# Patient Record
Sex: FEMALE | Race: BLACK OR AFRICAN AMERICAN | NOT HISPANIC OR LATINO | Employment: FULL TIME | URBAN - METROPOLITAN AREA
[De-identification: names, ages, dates, MRNs, and addresses within clinical notes are randomized per-mention and may not be internally consistent; named-entity substitution may affect disease eponyms.]

---

## 2017-01-02 ENCOUNTER — HOSPITAL ENCOUNTER (EMERGENCY)
Facility: HOSPITAL | Age: 27
Discharge: HOME | End: 2017-01-02
Attending: EMERGENCY MEDICINE
Payer: MEDICAID

## 2017-01-02 VITALS
TEMPERATURE: 98 F | RESPIRATION RATE: 18 BRPM | SYSTOLIC BLOOD PRESSURE: 167 MMHG | WEIGHT: 220 LBS | HEART RATE: 79 BPM | BODY MASS INDEX: 35.36 KG/M2 | HEIGHT: 66 IN | OXYGEN SATURATION: 100 % | DIASTOLIC BLOOD PRESSURE: 95 MMHG

## 2017-01-02 DIAGNOSIS — H10.9 CONJUNCTIVITIS, UNSPECIFIED CONJUNCTIVITIS TYPE, UNSPECIFIED LATERALITY: Primary | ICD-10-CM

## 2017-01-02 LAB
B-HCG UR QL: NEGATIVE
CTP QC/QA: YES

## 2017-01-02 PROCEDURE — 81025 URINE PREGNANCY TEST: CPT

## 2017-01-02 PROCEDURE — 99283 EMERGENCY DEPT VISIT LOW MDM: CPT | Mod: 25

## 2017-01-02 RX ORDER — BACITRACIN ZINC AND POLYMYXIN B SULFATE 500; 10000 [USP'U]/G; [USP'U]/G
OINTMENT OPHTHALMIC 2 TIMES DAILY
Qty: 15 G | Refills: 0 | Status: SHIPPED | OUTPATIENT
Start: 2017-01-02 | End: 2017-01-12

## 2017-01-02 NOTE — DISCHARGE INSTRUCTIONS
Conjunctivitis, Nonspecific    The membrane that covers the white part of your eye (the conjunctiva) is inflamed. Inflammation happens when your body responds to an injury, allergic reaction, infection, or illness. Symptoms of inflammation in the eye may include redness, irritation, itching, swelling, or burning. These symptoms should go away within the next 24 hours. Conjunctivitis may be related to a particle that was in your eye. If so, it may wash out with your tears or irrigation treatment. Being exposed to liquid chemicals or fumes may also cause this reaction.   Home care  · Apply a cold pack (ice in a plastic bag, wrapped in a towel) over the eye for 20 minutes at a time. This will reduce pain.  · Artificial tears may be prescribed to reduce irritation or redness.  These should be used 3 to 4 times a day.  · You may use acetaminophen or ibuprofen to control pain, unless another medicine was prescribed.(Note: If you have chronic liver or kidney disease, or if you have ever had a stomach ulcer or gastrointestinal bleeding, talk with your healthcare provider before using these medicines.)  Follow-up care  Follow up with your healthcare provider, or as advised.  When to seek medical advice  Call your healthcare provider right away if any of these occur:  · Increased eyelid swelling  · Increased eye pain  · Increased redness or drainage from the eye  · Increased blurry vision or increased sensitivity to light  · Failure of normal vision to return within 24 to 48 hours  © 5008-2723 [employer]. [address] [place] [address]. All rights reserved. This information is not intended as a substitute for professional medical care. Always follow your healthcare professional's instructions.

## 2017-01-02 NOTE — ED PROVIDER NOTES
Encounter Date: 1/2/2017       History     Chief Complaint   Patient presents with    Conjunctivitis     pt c/o conjunctivitis to (L) eye starting at a 7:00 am     Review of patient's allergies indicates:  No Known Allergies  HPI    26 y.o. healthy female co OS redness, drainage, no other sx, no visual changes  No trauma to eye  +sick contacts  No self tx      Past Medical History   Diagnosis Date    Eczema      No past medical history pertinent negatives.  Past Surgical History   Procedure Laterality Date    Cholecystectomy       Family History   Problem Relation Age of Onset    Breast cancer Neg Hx     Colon cancer Neg Hx     Ovarian cancer Neg Hx      Social History   Substance Use Topics    Smoking status: Current Every Day Smoker     Packs/day: 0.50     Types: Cigarettes    Smokeless tobacco: Current User    Alcohol use Yes      Comment: ocassionally     Review of Systems  All systems were reviewed and were negative except as noted in the HPI.    Physical Exam   Initial Vitals   BP Pulse Resp Temp SpO2   01/02/17 1201 01/02/17 1201 01/02/17 1201 01/02/17 1201 01/02/17 1201   167/95 79 18 98.4 °F (36.9 °C) 100 %     Physical Exam    Gen: awake, alert, NAD  OS injection, AC quiet, PERRLA EOMI, lids normal  Head NCAT, sclera clear OD  Neck supple, no meningismus  Chest clear to auscultation, no respiratory distress  Extremities W/WP   Skin w/d  Psych conversant  Neuro: A/A, TOLLIVER      ED Course   Procedures  Labs Reviewed   POCT URINE PREGNANCY              top abx                 ED Course     Clinical Impression:   The encounter diagnosis was Conjunctivitis, unspecified conjunctivitis type, unspecified laterality.      Discharged to home in stable condition, return to ED warnings given, follow up and patient care instructions given.    Tucker Hall MD, IDALIA, CPE, FACEP  Department of Emergency Medicine  , [employer]       Alfonso Hall,  MD  01/02/17 6838

## 2017-01-02 NOTE — ED AVS SNAPSHOT
[facility]  [place] [address]               Georgina Mehta   2017 11:51 AM   ED    Description:  Female : 1990   Department:  [facility]           Your Care was Coordinated By:     Provider Role From To    Alfonso Hall MD Attending Provider 17 1206 --      Reason for Visit     Conjunctivitis           Diagnoses this Visit        Comments    Conjunctivitis, unspecified conjunctivitis type, unspecified laterality    -  Primary       ED Disposition     ED Disposition Condition Comment    Discharge             To Do List           Follow-up Information     Schedule an appointment as soon as possible for a visit with [facility].    Specialty:  Internal Medicine    Contact information:    [address] [address] [phone]    Additional information:    3rd Floor, [address]       These Medications        Disp Refills Start End    bacitracin-polymyxin b (POLYSPORIN) ophthalmic ointment 15 g 0 2017    Place into the left eye 2 (two) times daily. - Left Eye    [facility] [address] [place] [address] Ph #: [phone]         [facility]     [facility] Care [facility] -  Assistance  Registered nurses in the [facility] provide clinical advisement, health education, appointment booking, and other advisory services.  Call for this free service at [phone].             Medications           Message regarding Medications     Verify the changes and/or additions to your medication regime listed below are the same as discussed with your clinician today.  If any of these changes or additions are incorrect, please notify your healthcare provider.        START taking these NEW medications        Refills    bacitracin-polymyxin b (POLYSPORIN) ophthalmic ointment 0    Sig: Place into the left eye 2 (two) times  "daily.    Class: Print    Route: Left Eye           Verify that the below list of medications is an accurate representation of the medications you are currently taking.  If none reported, the list may be blank. If incorrect, please contact your healthcare provider. Carry this list with you in case of emergency.           Current Medications     bacitracin-polymyxin b (POLYSPORIN) ophthalmic ointment Place into the left eye 2 (two) times daily.    DEXTROMETHORPHAN HBR (VICKS DAYQUIL COUGH ORAL) Take by mouth.    naproxen (NAPROSYN) 375 MG tablet Take 1 tablet (375 mg total) by mouth 2 (two) times daily with meals.    PNV with Ca,no.71-iron-FA (PRENATE PLUS) 27-1 mg Tab Take 1 tablet by mouth once daily.           Clinical Reference Information           Your Vitals Were     BP Pulse Temp Resp Height Weight    167/95 79 98.4 °F (36.9 °C) 18 5' 6" (1.676 m) 99.8 kg (220 lb)    SpO2 BMI             100% 35.51 kg/m2         Allergies as of 1/2/2017     No Known Allergies      Immunizations Administered on Date of Encounter - 1/2/2017     None      ED Micro, Lab, POCT     Start Ordered       Status Ordering Provider    01/02/17 1204 01/02/17 1203  POCT urine pregnancy  Once      Ordered       ED Imaging Orders     None        Discharge Instructions         Conjunctivitis, Nonspecific    The membrane that covers the white part of your eye (the conjunctiva) is inflamed. Inflammation happens when your body responds to an injury, allergic reaction, infection, or illness. Symptoms of inflammation in the eye may include redness, irritation, itching, swelling, or burning. These symptoms should go away within the next 24 hours. Conjunctivitis may be related to a particle that was in your eye. If so, it may wash out with your tears or irrigation treatment. Being exposed to liquid chemicals or fumes may also cause this reaction.   Home care  · Apply a cold pack (ice in a plastic bag, wrapped in a towel) over the eye for 20 minutes " at a time. This will reduce pain.  · Artificial tears may be prescribed to reduce irritation or redness.  These should be used 3 to 4 times a day.  · You may use acetaminophen or ibuprofen to control pain, unless another medicine was prescribed.(Note: If you have chronic liver or kidney disease, or if you have ever had a stomach ulcer or gastrointestinal bleeding, talk with your healthcare provider before using these medicines.)  Follow-up care  Follow up with your healthcare provider, or as advised.  When to seek medical advice  Call your healthcare provider right away if any of these occur:  · Increased eyelid swelling  · Increased eye pain  · Increased redness or drainage from the eye  · Increased blurry vision or increased sensitivity to light  · Failure of normal vision to return within 24 to 48 hours  © 7983-5075 [employer]. [address] [place] [address], [place] [address]. All rights reserved. This information is not intended as a substitute for professional medical care. Always follow your healthcare professional's instructions.          [facility] Sign-Up     Activating your [facility] account is as easy as 1-2-3!     1) Visit [email or web address], select Sign Up Now, enter this activation code and your date of birth, then select Next.  [email or web address]  Expires: 2/16/2017 12:11 PM      2) Create a username and password to use when you visit [facility] in the future and select a security question in case you lose your password and select Next.    3) Enter your e-mail address and click Sign Up!    Additional Information  If you have questions, please e-mail [email or web address] or call [phone] to talk to our [facility] staff. Remember, [facility] is NOT to be used for urgent needs. For medical emergencies, dial 911.         Smoking Cessation     If you would like to quit smoking:   You may be eligible for free services if you are a [place] resident and started smoking cigarettes before September 1,  1988.  Call the Smoking Cessation Trust (SCT) toll free at (607) 522-0456 or (096) 088-4718.   Call 1-800-QUIT-NOW if you do not meet the above criteria.             [facility] complies with applicable Federal civil rights laws and does not discriminate on the basis of race, color, national origin, age, disability, or sex.        Language Assistance Services     ATTENTION: Language assistance services are available, free of charge. Please call [phone].      ATENCIÓN: Si habla español, tiene a chavez disposición servicios gratuitos de asistencia lingüística. Llame al [phone].     CHÚ Ý: N?u b?n nói Ti?ng Vi?t, có các d?ch v? h? tr? ngôn ng? mi?n phí dành cho b?n. G?i s? [phone].

## 2017-01-31 ENCOUNTER — HOSPITAL ENCOUNTER (EMERGENCY)
Facility: HOSPITAL | Age: 27
Discharge: LEFT BEFORE DISCHARGE | End: 2017-01-31
Attending: EMERGENCY MEDICINE
Payer: MEDICAID

## 2017-01-31 VITALS
DIASTOLIC BLOOD PRESSURE: 63 MMHG | TEMPERATURE: 98 F | BODY MASS INDEX: 36.16 KG/M2 | RESPIRATION RATE: 18 BRPM | WEIGHT: 225 LBS | SYSTOLIC BLOOD PRESSURE: 132 MMHG | OXYGEN SATURATION: 100 % | HEIGHT: 66 IN | HEART RATE: 89 BPM

## 2017-01-31 DIAGNOSIS — R05.9 COUGH: ICD-10-CM

## 2017-01-31 LAB
B-HCG UR QL: NEGATIVE
CTP QC/QA: YES
FLUAV AG SPEC QL IA: NEGATIVE
FLUBV AG SPEC QL IA: NEGATIVE
SPECIMEN SOURCE: NORMAL

## 2017-01-31 PROCEDURE — 87400 INFLUENZA A/B EACH AG IA: CPT

## 2017-01-31 PROCEDURE — 99283 EMERGENCY DEPT VISIT LOW MDM: CPT | Mod: 25

## 2017-01-31 PROCEDURE — 81025 URINE PREGNANCY TEST: CPT | Performed by: EMERGENCY MEDICINE

## 2017-01-31 PROCEDURE — 25000003 PHARM REV CODE 250: Performed by: PHYSICIAN ASSISTANT

## 2017-01-31 RX ORDER — CETIRIZINE HYDROCHLORIDE 10 MG/1
10 TABLET ORAL
Status: COMPLETED | OUTPATIENT
Start: 2017-01-31 | End: 2017-01-31

## 2017-01-31 RX ORDER — IBUPROFEN 600 MG/1
600 TABLET ORAL
Status: COMPLETED | OUTPATIENT
Start: 2017-01-31 | End: 2017-01-31

## 2017-01-31 RX ADMIN — IBUPROFEN 600 MG: 600 TABLET, FILM COATED ORAL at 01:01

## 2017-01-31 RX ADMIN — CETIRIZINE HYDROCHLORIDE 10 MG: 10 TABLET, FILM COATED ORAL at 01:01

## 2017-01-31 NOTE — ED NOTES
"Patient has told multiple nurses that "It don't make sense for me to wait for an x-ray, my body is hurting so bad, I'm bout to leave."  Patient also was overheard making similar remarks on phone while in [facility].  Patient was then visualized leaving department, not responding to name called.  Provider notified, patient has eloped.   "

## 2017-01-31 NOTE — ED TRIAGE NOTES
C/o body aches, cough, sore throat, weakness, nasal congestion, runny nose. Denies N/V/D/F x 1 days. No OTC meds taken today.

## 2017-01-31 NOTE — ED PROVIDER NOTES
"Encounter Date: 1/31/2017    SCRIBE #1 NOTE: I, Bre Vaz, am scribing for, and in the presence of,  Stanley Hager PA-C. I have scribed the following portions of the note - Other sections scribed: ROS and HPI.       History     Chief Complaint   Patient presents with    Generalized Body Aches     Pt. c/o body aches, cough and sore throat that began yesterday.      Review of patient's allergies indicates:  No Known Allergies  HPI Comments: CC: Generalized Body Aches    HPI: This 26 y.o. female with a past medical history of Eczema, presents to the ED complaining of  sore throat, painful swallowing, productive cough "brown/yellow" mucus, chills, generalized body aches, CP after cough, intermittent SOB since yesterday. Symptoms are acute, moderate, and constant. No flu shot this yr. No prior medical intervention today. No known recent sick exposure.  The history is provided by the patient. No  was used.     Past Medical History   Diagnosis Date    Eczema      No past medical history pertinent negatives.  Past Surgical History   Procedure Laterality Date    Cholecystectomy       Family History   Problem Relation Age of Onset    Breast cancer Neg Hx     Colon cancer Neg Hx     Ovarian cancer Neg Hx      Social History   Substance Use Topics    Smoking status: Current Every Day Smoker     Packs/day: 0.50     Types: Cigarettes    Smokeless tobacco: Current User    Alcohol use Yes      Comment: ocassionally     Review of Systems   Constitutional: Negative for chills and fever.   HENT: Positive for rhinorrhea and sore throat. Negative for ear pain.    Respiratory: Positive for cough (productive) and shortness of breath.    Cardiovascular: Positive for chest pain (with cough).   Gastrointestinal: Negative for abdominal pain, diarrhea, nausea and vomiting.   Genitourinary: Negative for frequency and hematuria.   Musculoskeletal: Positive for myalgias (generalized).       Physical Exam   Initial " Vitals   BP Pulse Resp Temp SpO2   01/31/17 1025 01/31/17 1025 01/31/17 1025 01/31/17 1025 01/31/17 1025   142/85 88 17 98.9 °F (37.2 °C) 99 %     Physical Exam    Vitals reviewed.  Constitutional: She appears well-developed and well-nourished. She is not diaphoretic. No distress.   HENT:   Head: Normocephalic and atraumatic.   Right Ear: External ear normal.   Left Ear: External ear normal.   Nose: Nose normal.   Mouth/Throat: Oropharynx is clear and moist.   Sinus congestion with no maxillary or frontal sinus tenderness.   Eyes: Conjunctivae are normal. No scleral icterus.   Neck: Normal range of motion. Neck supple.   Cardiovascular: Normal rate, regular rhythm, normal heart sounds and intact distal pulses.   Pulmonary/Chest: Breath sounds normal. No respiratory distress. She has no wheezes. She has no rhonchi. She has no rales. She exhibits no tenderness.   Musculoskeletal: Normal range of motion.   Lymphadenopathy:     She has no cervical adenopathy.   Neurological: She is alert and oriented to person, place, and time.   Skin: Skin is warm and dry.         ED Course   Procedures  Labs Reviewed   INFLUENZA A AND B ANTIGEN   POCT URINE PREGNANCY             Medical Decision Making:   History:   Old Medical Records: I decided to obtain old medical records.    Emergency evaluation of a 26-year-old female complaining of myalgias, sore throat, productive cough since yesterday.  She presents in no distress, afebrile and with normal vital signs.  HEENT exam is unremarkable.  I doubt peritonsillar abscess, posterior pharyngeal abscess, epiglottitis.  Lungs sounds are clear with normal work of breathing.  Heart sounds are normal.  Chest x-ray obtained shows no consolidation, effusion, pneumothorax.  I doubt pneumonia.  Influenza test is negative.  I suspect patient has a viral URI with cough and will plan to treat with NSAIDs and antihistamine.  Patient eloped prior to discussion of chest x-ray results and discharge.           Scribe Attestation:   Scribe #1: I performed the above scribed service and the documentation accurately describes the services I performed. I attest to the accuracy of the note.    Attending Attestation:           Physician Attestation for Scribe:  Physician Attestation Statement for Scribe #1: I, Stanley Hager PA-C, reviewed documentation, as scribed by Bre Vaz in my presence, and it is both accurate and complete.                 ED Course     Clinical Impression:   The encounter diagnosis was Cough.          Stanley Hager PA-C  01/31/17 4360

## 2017-03-14 ENCOUNTER — HOSPITAL ENCOUNTER (EMERGENCY)
Facility: HOSPITAL | Age: 27
Discharge: HOME | End: 2017-03-14
Attending: EMERGENCY MEDICINE
Payer: MEDICAID

## 2017-03-14 VITALS
WEIGHT: 230 LBS | RESPIRATION RATE: 18 BRPM | OXYGEN SATURATION: 98 % | SYSTOLIC BLOOD PRESSURE: 135 MMHG | HEIGHT: 66 IN | DIASTOLIC BLOOD PRESSURE: 71 MMHG | HEART RATE: 92 BPM | TEMPERATURE: 99 F | BODY MASS INDEX: 36.96 KG/M2

## 2017-03-14 DIAGNOSIS — R06.02 SHORTNESS OF BREATH: Primary | ICD-10-CM

## 2017-03-14 LAB
B-HCG UR QL: NEGATIVE
CTP QC/QA: YES

## 2017-03-14 PROCEDURE — 81025 URINE PREGNANCY TEST: CPT | Performed by: EMERGENCY MEDICINE

## 2017-03-14 PROCEDURE — 93005 ELECTROCARDIOGRAM TRACING: CPT

## 2017-03-14 PROCEDURE — 99284 EMERGENCY DEPT VISIT MOD MDM: CPT | Mod: 25

## 2017-03-15 NOTE — ED PROVIDER NOTES
Encounter Date: 3/14/2017    SCRIBE #1 NOTE: I, Maryam Lopez, am scribing for, and in the presence of,  Aren Kee MD. I have scribed the following portions of the note - Other sections scribed: HPI/ROS.       History     Review of patient's allergies indicates:  No Known Allergies  HPI Comments: CC: Shortness of Breath     HPI: This 26 y.o. F who has history of eczema and cholecystectomy presents to the ED for evaluation of mild chest discomfort with associated palpitations, SOB, and R arm numbness that occurred 1 hour prior to arrival while the patient was in the shower. She reports intermittent leg swelling that she attributes to being on her feet at work, but currently denies leg swelling. No recent travel or BC use. The pt denies fever, chills, N/V/D, and any other associated symptoms.     The history is provided by the patient. No  was used.     Past Medical History:   Diagnosis Date    Eczema      Past Surgical History:   Procedure Laterality Date    CHOLECYSTECTOMY       Family History   Problem Relation Age of Onset    Breast cancer Neg Hx     Colon cancer Neg Hx     Ovarian cancer Neg Hx      Social History   Substance Use Topics    Smoking status: Current Every Day Smoker     Packs/day: 0.50     Types: Cigarettes    Smokeless tobacco: Current User    Alcohol use Yes      Comment: ocassionally     Review of Systems   Constitutional: Negative for chills, diaphoresis and fever.   HENT: Negative for congestion and sore throat.    Eyes: Negative for visual disturbance.   Respiratory: Positive for shortness of breath. Negative for cough.    Cardiovascular: Positive for chest pain (mild discomfort) and palpitations. Negative for leg swelling.   Gastrointestinal: Negative for abdominal pain, diarrhea, nausea and vomiting.   Genitourinary: Negative for dysuria and frequency.   Musculoskeletal: Negative for myalgias.   Skin: Negative for rash.   Neurological: Positive for numbness  "(R arm). Negative for headaches.       Physical Exam   Initial Vitals   BP Pulse Resp Temp SpO2   03/14/17 1835 03/14/17 1835 03/14/17 1835 03/14/17 1835 03/14/17 1835   158/95 96 20 98.3 °F (36.8 °C) 100 %     Physical Exam    Constitutional: She appears well-developed and well-nourished. She is not diaphoretic. No distress.   HENT:   Head: Normocephalic and atraumatic.   Right Ear: External ear normal.   Left Ear: External ear normal.   Eyes: Conjunctivae and EOM are normal. Pupils are equal, round, and reactive to light.   Neck: Normal range of motion.   Cardiovascular: Normal rate and regular rhythm. Exam reveals no friction rub.    No murmur heard.  Pulmonary/Chest: Breath sounds normal. No respiratory distress. She has no wheezes. She has no rhonchi. She has no rales.   Abdominal: She exhibits no distension.   Musculoskeletal: She exhibits no edema.   Neurological: She is alert. GCS eye subscore is 4. GCS verbal subscore is 5. GCS motor subscore is 6.   Skin: Skin is warm and dry.   Psychiatric: She has a normal mood and affect. Thought content normal.         ED Course   Procedures  Labs Reviewed   POCT URINE PREGNANCY             Medical Decision Making:   Initial Assessment:   26-year-old female presents after an episode of shortness of breath, chest discomfort, palpitations, and feeling like she "was going to die".  No history of similar episodes. Only risk factor for pulmonary embolism is smoking.  Physical examination reveals normal vital signs, well appearance, normal cardiopulmonary exam, no signs of DVT, no hypoxia or tachycardia.  Differential Diagnosis:   Symptoms sound most likely to represent panic/anxiety.  Will obtain EKG and chest x-ray to screen for cardiopulmonary abnormality.  Very low likelihood of pulmonary embolus, arrhythmia.  Independently Interpreted Test(s):   I have ordered and independently interpreted X-rays - see summary below.       <> Summary of X-Ray Reading(s): Chest x-ray: No " acute abnormality  I have ordered and independently interpreted EKG Reading(s) - see summary below       <> Summary of EKG Reading(s):  NSR, nl axis, nl intervals, no definite acute ischemic changes  ED Management:  Workup unremarkable.  Have counseled the patient regarding possibility of panic/anxiety, recommending follow-up with primary physician.  I have also counseled her regarding the remote possibility of arrhythmia and suggested follow-up with a cardiologist if she has continued episodes.      Patient counseled regarding test results, recommendations for supportive care, and need for follow-up.  Return precautions given.              Scribe Attestation:   Scribe #1: I performed the above scribed service and the documentation accurately describes the services I performed. I attest to the accuracy of the note.    Attending Attestation:           Physician Attestation for Scribe:  Physician Attestation Statement for Scribe #1: I, Aren Kee MD, reviewed documentation, as scribed by Maryam Lopez in my presence, and it is both accurate and complete.                 ED Course     Clinical Impression:   There were no encounter diagnoses.          Aren Kee III, MD  03/14/17 2029

## 2017-03-15 NOTE — ED TRIAGE NOTES
Pt states that today at 4:00pm started having chest pains and shortness of breath.  Pt states that heart was beating really fast.  Pt was lightheadedness at the time of chest pains.  Pt states pain is at the level of left chest.  Pain rates 7

## 2017-03-15 NOTE — DISCHARGE INSTRUCTIONS
Your EKG and chest x-ray were normal.  Return to the emergency department if you develop severe chest pain, fainting, severe difficulty breathing, or for any worsening medical concerns.

## 2022-10-10 NOTE — ED AVS SNAPSHOT
[facility]  [place] [address]               Georgina Mehta   3/14/2017  7:24 PM   ED    Description:  Female : 1990   Department:  [facility]           Your Care was Coordinated By:     Provider Role From To    Aren Kee III, MD Attending Provider 17 1943 --      Reason for Visit     Chest Pain           Diagnoses this Visit        Comments    Shortness of breath    -  Primary       ED Disposition     None           To Do List           Follow-up Information     Follow up with Shahab Martinez Jr, MD.    Specialty:  Family Medicine    Contact information:    [address] [employer]  [facility] [id number] [phone]          Follow up with Benjamin Paula MD.    Specialty:  Cardiology    Why:  if you have more episode    Contact information:    [address] [place] [address]  [phone]        [facility]     [facility] -  Assistance  Registered nurses in the [facility] provide clinical advisement, health education, appointment booking, and other advisory services.  Call for this free service at [phone].             Medications           Message regarding Medications     Verify the changes and/or additions to your medication regime listed below are the same as discussed with your clinician today.  If any of these changes or additions are incorrect, please notify your healthcare provider.             Verify that the below list of medications is an accurate representation of the medications you are currently taking.  If none reported, the list may be blank. If incorrect, please contact your healthcare provider. Carry this list with you in case of emergency.                Clinical Reference Information           Your Vitals Were     BP Pulse Temp Resp Height Weight    135/71 (BP Location: Right arm, Patient Position: Sitting, BP Method: Automatic) 92 98.6 °F  Patient's blood pressure is at goal   Continue valsartan 160, hydrochlorothiazide 25 mg  "(37 °C) (Oral) 18 5' 6" (1.676 m) 104.3 kg (230 lb)    Last Period SpO2 BMI          03/04/2017 98% 37.12 kg/m2        Allergies as of 3/14/2017     No Known Allergies      Immunizations Administered on Date of Encounter - 3/14/2017     None      ED Micro, Lab, POCT     Start Ordered       Status Ordering Provider    03/14/17 1852 03/14/17 1851  POCT urine pregnancy  Once      Final result       ED Imaging Orders     Start Ordered       Status Ordering Provider    03/14/17 2000 03/14/17 1959  X-Ray Chest PA And Lateral  1 time imaging      Final result         Discharge Instructions       Your EKG and chest x-ray were normal.  Return to the emergency department if you develop severe chest pain, fainting, severe difficulty breathing, or for any worsening medical concerns.    [facility] Sign-Up     Activating your [facility] account is as easy as 1-2-3!     1) Visit [email or web address], select Sign Up Now, enter this activation code and your date of birth, then select Next.  [email or web address]  Expires: 4/28/2017  8:26 PM      2) Create a username and password to use when you visit [facility] in the future and select a security question in case you lose your password and select Next.    3) Enter your e-mail address and click Sign Up!    Additional Information  If you have questions, please e-mail [email or web address] or call [phone] to talk to our [facility] staff. Remember, [facility] is NOT to be used for urgent needs. For medical emergencies, dial 911.          [facility] complies with applicable Federal civil rights laws and does not discriminate on the basis of race, color, national origin, age, disability, or sex.        Language Assistance Services     ATTENTION: Language assistance services are available, free of charge. Please call [phone].      ATENCIÓN: Si habla español, tiene a chavez disposición servicios gratuitos de asistencia lingüística. Llame al [phone].     CHÚ Ý: N?u b?n nói " Ti?ng Vi?t, có các d?ch v? h? tr? ngôn ng? mi?n phí dành cho b?n. G?i s? [phone].

## 2023-06-20 ENCOUNTER — PATIENT MESSAGE (OUTPATIENT)
Dept: RESEARCH | Facility: HOSPITAL | Age: 33
End: 2023-06-20

## 2023-06-27 ENCOUNTER — PATIENT MESSAGE (OUTPATIENT)
Dept: RESEARCH | Facility: HOSPITAL | Age: 33
End: 2023-06-27

## 2024-07-03 ENCOUNTER — ON-DEMAND VIRTUAL (OUTPATIENT)
Dept: URGENT CARE | Facility: CLINIC | Age: 34
End: 2024-07-03

## 2024-07-03 DIAGNOSIS — B37.9 YEAST INFECTION: Primary | ICD-10-CM

## 2024-07-03 RX ORDER — BUPROPION HYDROCHLORIDE 100 MG/1
100 TABLET, EXTENDED RELEASE ORAL
COMMUNITY
Start: 2024-04-22

## 2024-07-03 RX ORDER — FLUCONAZOLE 150 MG/1
150 TABLET ORAL DAILY
Qty: 2 TABLET | Refills: 0 | Status: SHIPPED | OUTPATIENT
Start: 2024-07-03 | End: 2024-07-05

## 2024-07-03 NOTE — PROGRESS NOTES
Subjective:      Patient ID: Georgina Mehta is a 33 y.o. female.    Vitals:  vitals were not taken for this visit.     Chief Complaint: Vaginitis      Visit Type: TELE AUDIOVISUAL    Present with the patient at the time of consultation: TELEMED PRESENT WITH PATIENT: None, at home    Past Medical History:   Diagnosis Date    Chlamydia     Eczema      Past Surgical History:   Procedure Laterality Date    CHOLECYSTECTOMY       Review of patient's allergies indicates:  No Known Allergies  Current Outpatient Medications on File Prior to Visit   Medication Sig Dispense Refill    buPROPion (WELLBUTRIN SR) 100 MG TBSR 12 hr tablet Take 100 mg by mouth.      [DISCONTINUED] fluconazole (DIFLUCAN) 150 MG Tab Take 1 tablet by mouth today and again in 3 days 2 tablet 0     No current facility-administered medications on file prior to visit.     Family History   Problem Relation Name Age of Onset    Breast cancer Maternal Aunt      Colon cancer Neg Hx      Ovarian cancer Neg Hx         Medications Ordered                [id number] - [facility] [address] AT [address] & [place] [address] [facility] [address]    Telephone: [phone]   Fax: [phone]   Hours: Not open 24 hours                         E-Prescribed (1 of 1)              fluconazole (DIFLUCAN) 150 MG Tab    Sig: Take 1 tablet (150 mg total) by mouth once daily. Take 1 tablet as a single dose. If symptoms persist, may repeat a second dose in 72 hours. for 2 days       Start: 7/3/24     Quantity: 2 tablet Refills: 0                           Ohs Peq Odvv Intake    7/3/2024  7:10 AM CDT - Filed by Patient   What is your current physical address in the event of a medical emergency? [address] [address]   Are you able to take your vital signs? No   Please attach any relevant images or files          Yeast symptoms for 2 day. Frequent infections. Last treated in June.  Followed by GYN. +itching and mild discharge. No odor. No dysuria. No rash. No other associated symptoms to report.        Genitourinary:  Positive for vaginal discharge. Negative for dysuria, vaginal pain, vaginal bleeding, vaginal odor, genital sore and pelvic pain.   Skin:  Negative for rash.   Allergic/Immunologic: Positive for itching (vaginal).        Objective:   The physical exam was conducted virtually.  Physical Exam   Constitutional: She is oriented to person, place, and time. She does not appear ill. No distress.   HENT:   Head: Normocephalic and atraumatic.   Nose: Nose normal.   Eyes: Extraocular movement intact   Pulmonary/Chest: Effort normal.   Abdominal: Normal appearance.   Musculoskeletal: Normal range of motion.         General: Normal range of motion.   Neurological: no focal deficit. She is alert and oriented to person, place, and time.   Psychiatric: Her behavior is normal. Mood normal.   Vitals reviewed.      Assessment:     1. Yeast infection        Plan:   Patient encouraged to monitor symptoms closely and instructed to follow-up for new or worsening symptoms. Further, in-person, evaluation may be necessary for continued treatment. Please follow up with your primary care doctor or specialist as needed. Verbally discussed plan. Patient confirms understanding and is in agreement with treatment and plan.     You must understand that you've received a Virtual Care evaluation only and that you may be released before all your medical problems are known or treated. You, the patient, will arrange for follow up care as instructed.      Yeast infection  -     fluconazole (DIFLUCAN) 150 MG Tab; Take 1 tablet (150 mg total) by mouth once daily. Take 1 tablet as a single dose. If symptoms persist, may repeat a second dose in 72 hours. for 2 days  Dispense: 2 tablet; Refill: 0        Patient Instructions   Patient Education       Vaginal Yeast Infection Discharge Instructions   About this topic   Yeast  infections are caused by a germ called a fungus. The germs live almost everywhere on your body. The germs grow best in dark moist areas of your body like the vagina. Yeast germs also grow on your skin. Most often, your immune system can control the amount of yeast and you stay healthy. If you are sick, the yeast can multiply and cause an infection. An infection in your vagina can cause very bad itching. You may also have a discharge that looks like cottage cheese. You are more likely to get a yeast infection if you are:   On steroids or antibiotics  Pregnant  A diabetic or have high blood sugar  On birth control pills  A woman who uses feminine washes or douches  Not taking good care of your skin and keeping your skin clean  A person with problems with the immune system  What care is needed at home?   Ask your doctor what you need to do when you go home. Make sure you ask questions if you do not understand what you need to do.  Practice good hygiene. Wash often with soap and water. Take a shower instead of a bath. Avoid bubble baths. Pat dry with a clean towel.  Keep moist areas of the body clean, cool, and dry.  Do not use douches or feminine sprays.  Wear cotton underwear. Avoid tight-fitting pants or shorts.  Change your wet bathing suit or damp workout clothes as soon as possible.  Avoid sexual contact until both you and your partner are free of infection.  Always wipe from front to back after going to the restroom.  What follow-up care is needed?   Your doctor may ask you to make visits to the office to check on your progress. Be sure to keep your visits.  What drugs may be needed?   The doctor may order drugs to:  Help itching  Fight an infection  Take your drugs as ordered. Do not stop until your doctor tells you to do so.  Will physical activity be limited?   Pain and itching may cause you to limit your activities for a short while.  What changes to diet are needed?   If you have diabetes, control your blood  "sugar.  Ask your doctor about eating yogurt "with active cultures" if on antibiotic therapy.  Avoid beer, wine, and mixed drinks (alcohol) when taking drugs to treat a yeast infection.  What problems could happen?   Long-term infection or the infection comes back  Another infection with a different kind of germ  When do I need to call the doctor?   Signs of infection such as a fever of 100.4°F (38°C) or higher, chills, pain with passing urine, or mouth sores  An itching, red, moist skin rash, or cracks in the skin of the vagina  Pain in your mouth or throat with white patches (thrush)  Itchy vaginal discharge  You are not feeling better in 2 to 3 days or you are feeling worse  Teach Back: Helping You Understand   The Teach Back Method helps you understand the information we are giving you. After you talk with the staff, tell them in your own words what you learned. This helps to make sure the staff has described each thing clearly. It also helps to explain things that may have been confusing. Before going home, make sure you can do these:  I can tell you about my condition.  I can tell you what may help keep me from getting a vaginal infection again.  I can tell you what I will do if I have a fever, chills, itching, a rash, or vaginal discharge.  Where can I learn more?   Centers for Disease Control and Prevention  https://www.cdc.gov/fungal/diseases/candidiasis/genital/index.html   FamilyDoctor.org  http://familydoctor.org/familydoctor/en/diseases-conditions/yeast-infections.html   Women's Health  http://www.womenshealth.gov/publications/our-publications/fact-sheet/vaginal-yeast-infections.html   Last Reviewed Date   2019-11-26  Consumer Information Use and Disclaimer   This information is not specific medical advice and does not replace information you receive from your health care provider. This is only a brief summary of general information. It does NOT include all information about conditions, illnesses, " injuries, tests, procedures, treatments, therapies, discharge instructions or life-style choices that may apply to you. You must talk with your health care provider for complete information about your health and treatment options. This information should not be used to decide whether or not to accept your health care providers advice, instructions or recommendations. Only your health care provider has the knowledge and training to provide advice that is right for you.  Copyright   Copyright © 2021 UpToDate, Inc. and its affiliates and/or licensors. All rights reserved.

## 2024-07-28 ENCOUNTER — ON-DEMAND VIRTUAL (OUTPATIENT)
Dept: URGENT CARE | Facility: CLINIC | Age: 34
End: 2024-07-28
Payer: MEDICAID

## 2024-07-28 DIAGNOSIS — B96.89 BV (BACTERIAL VAGINOSIS): Primary | ICD-10-CM

## 2024-07-28 DIAGNOSIS — N76.0 BV (BACTERIAL VAGINOSIS): Primary | ICD-10-CM

## 2024-07-28 PROCEDURE — 99213 OFFICE O/P EST LOW 20 MIN: CPT | Mod: 95,,, | Performed by: NURSE PRACTITIONER

## 2024-07-28 RX ORDER — METRONIDAZOLE 500 MG/1
500 TABLET ORAL 2 TIMES DAILY
Qty: 14 TABLET | Refills: 0 | Status: SHIPPED | OUTPATIENT
Start: 2024-07-28 | End: 2024-08-04

## 2024-07-28 NOTE — PROGRESS NOTES
Subjective:      Patient ID: Georgina Mehta is a 33 y.o. female.    Vitals:  vitals were not taken for this visit.     Chief Complaint: Vaginitis      Visit Type: TELE AUDIOVISUAL    Present with the patient at the time of consultation: TELEMED PRESENT WITH PATIENT: None        Past Medical History:   Diagnosis Date    Chlamydia     Eczema      Past Surgical History:   Procedure Laterality Date    CHOLECYSTECTOMY       Review of patient's allergies indicates:  No Known Allergies  Current Outpatient Medications on File Prior to Visit   Medication Sig Dispense Refill    buPROPion (WELLBUTRIN SR) 100 MG TBSR 12 hr tablet Take 100 mg by mouth.       No current facility-administered medications on file prior to visit.     Family History   Problem Relation Name Age of Onset    Breast cancer Maternal Aunt      Colon cancer Neg Hx      Ovarian cancer Neg Hx         Medications Ordered                [id number] - [place] [id number] AT [address] [facility] & [place] [address] [facility] [address] [address]    Telephone: [phone]   Fax: [phone]   Hours: Not open 24 hours                         E-Prescribed (1 of 1)              metroNIDAZOLE (FLAGYL) 500 MG tablet    Sig: Take 1 tablet (500 mg total) by mouth 2 (two) times daily. for 7 days       Start: 7/28/24     Quantity: 14 tablet Refills: 0                           Ohs Peq Odvv Intake    7/28/2024 12:48 PM CDT - Filed by Patient   What is your current physical address in the event of a medical emergency?    Are you able to take your vital signs? No   Please attach any relevant images or files          24 yo female with c/o frequent yeast infections and took diflucan and now with discharge. She states she is having baginal odor. She denies abdominal pain and std. She denies concern for std.         Constitution: Negative.   HENT: Negative.     Neck: neck negative.   Cardiovascular: Negative.     Eyes: Negative.    Respiratory: Negative.     Endocrine: negative.   Genitourinary:  Positive for vaginal discharge and vaginal odor. Negative for dysuria, frequency, urgency and urine decreased.   Skin: Negative.    Allergic/Immunologic: Negative.    Neurological: Negative.    Hematologic/Lymphatic: Negative.    Psychiatric/Behavioral: Negative.          Objective:   The physical exam was conducted virtually.  LOCATION OF PATIENT home  Physical Exam   Constitutional: She is oriented to person, place, and time. She appears well-developed.   HENT:   Head: Normocephalic and atraumatic.   Ears:   Right Ear: Hearing, tympanic membrane and external ear normal.   Left Ear: Hearing, tympanic membrane and external ear normal.   Nose: Nose normal.   Mouth/Throat: Uvula is midline, oropharynx is clear and moist and mucous membranes are normal.   Eyes: Conjunctivae and EOM are normal. Pupils are equal, round, and reactive to light.   Neck: Neck supple.   Cardiovascular: Normal rate.   Pulmonary/Chest: Effort normal and breath sounds normal.   Musculoskeletal: Normal range of motion.         General: Normal range of motion.   Neurological: She is alert and oriented to person, place, and time.   Skin: Skin is warm.   Psychiatric: Her behavior is normal. Thought content normal.   Nursing note and vitals reviewed.      Assessment:     1. BV (bacterial vaginosis)        Plan:   PLEASE READ YOUR DISCHARGE INSTRUCTIONS ENTIRELY AS IT CONTAINS IMPORTANT INFORMATION.     Take the flagyl twice daily for 7 days - do not drink alcohol or anything containing alcohol while taking this medication    A culture of your vagina was sent. You will be contacted once it results in about 3-5 days and appropriate action will be taken.      Try taking an over the counter probiotic or eating yogurt.       Please return or see your primary care doctor if you develop new or worsening symptoms.      Please arrange follow up with your primary medical  clinic as soon as possible. You must understand that you've received an Urgent Care treatment only and that you may be released before all of your medical problems are known or treated. You, the patient, will arrange for follow up as instructed. If your symptoms worsen or fail to improve you should go to the Emergency Room.      BV (bacterial vaginosis)  -     metroNIDAZOLE (FLAGYL) 500 MG tablet; Take 1 tablet (500 mg total) by mouth 2 (two) times daily. for 7 days  Dispense: 14 tablet; Refill: 0

## 2024-08-26 ENCOUNTER — ON-DEMAND VIRTUAL (OUTPATIENT)
Dept: URGENT CARE | Facility: CLINIC | Age: 34
End: 2024-08-26
Payer: MEDICAID

## 2024-08-26 DIAGNOSIS — B37.31 VAGINAL YEAST INFECTION: Primary | ICD-10-CM

## 2024-08-26 PROCEDURE — 99213 OFFICE O/P EST LOW 20 MIN: CPT | Mod: 95,,,

## 2024-08-26 RX ORDER — FLUCONAZOLE 150 MG/1
TABLET ORAL
Qty: 2 TABLET | Refills: 0 | Status: SHIPPED | OUTPATIENT
Start: 2024-08-26

## 2024-08-26 NOTE — PATIENT INSTRUCTIONS
You must understand that you've received an Urgent Care treatment only and that you may be released before all your medical problems are known or treated. You, the patient, will arrange for follow up care as instructed.  Follow up with your PCP or specialty clinic as directed in the next 1-2 weeks if not improved or as needed.  You can call [phone] to schedule an appointment with the appropriate provider.  If your condition worsens we recommend that you receive another evaluation at the emergency room immediately or contact your primary medical clinics after hours call service to discuss your concerns.  Please return here or go to the Emergency Department for any concerns or worsening of condition.  Please if you smoke please consider quitting. [facility] Smoke cessation hotline number is [phone], available at this number is free counseling and medications to live a healthier life!         If you were prescribed a narcotic or controlled medication, do not drive or operate heavy equipment or machinery while taking these medications.

## 2024-08-26 NOTE — PROGRESS NOTES
Subjective:      Patient ID: Georgina Mehta is a 33 y.o. female at work    Vitals:  vitals were not taken for this visit.     Chief Complaint: Vaginitis      Visit Type: TELE AUDIOVISUAL    Present with the patient at the time of consultation: TELEMED PRESENT WITH PATIENT: None    Past Medical History:   Diagnosis Date    Chlamydia     Eczema     Gonorrhea 08/08/2024    Prediabetes 08/08/2024     Past Surgical History:   Procedure Laterality Date    CHOLECYSTECTOMY       Review of patient's allergies indicates:  No Known Allergies  Current Outpatient Medications on File Prior to Visit   Medication Sig Dispense Refill    boric acid (BORIC ACID) vaginal suppository Place 1 each (650 mg total) vaginally twice a week. 16 suppository 6    buPROPion (WELLBUTRIN SR) 100 MG TBSR 12 hr tablet Take 100 mg by mouth.      [DISCONTINUED] fluconazole (DIFLUCAN) 150 MG Tab Take one tablet every 3 days for a total of 3 doses 3 tablet 0     No current facility-administered medications on file prior to visit.     Family History   Problem Relation Name Age of Onset    Breast cancer Maternal Aunt      Colon cancer Neg Hx      Ovarian cancer Neg Hx         Medications Ordered                [id number] - [facility] [address] AT [facility] & [place] [address] [facility] [address]    Telephone: [phone]   Fax: [phone]   Hours: Not open 24 hours                         E-Prescribed (1 of 1)              fluconazole (DIFLUCAN) 150 MG Tab    Sig: Take one table now and repeat in 3 days       Start: 8/26/24     Quantity: 2 tablet Refills: 0                           Ohs Peq Odvv Intake    8/26/2024  1:54 PM CDT - Filed by Patient   What is your current physical address in the event of a medical emergency?    Are you able to take your vital signs? No   Please attach any relevant images or files          Pt states that yesterday when she was in the shower  she began to notice vaginal irritation. Pt states that she was dx with gonorrhea and treated for this pt states she goes back for a recheck in several of months. Pt states that she has not been sexually active since. Pt states that she is having thick white chunky discharge. Pt denies any other symptoms         Genitourinary:  Positive for vaginal discharge.        Objective:   The physical exam was conducted virtually.  Physical Exam   Constitutional: She is oriented to person, place, and time.   HENT:   Head: Normocephalic and atraumatic.   Nose: Nose normal.   Eyes: Conjunctivae are normal. Pupils are equal, round, and reactive to light. Extraocular movement intact   Pulmonary/Chest: Effort normal.   Abdominal: Normal appearance.   Musculoskeletal: Normal range of motion.         General: Normal range of motion.   Neurological: no focal deficit. She is oriented to person, place, and time.   Skin: Skin is warm.   Psychiatric: Her behavior is normal. Mood, judgment and thought content normal.       Assessment:     1. Vaginal yeast infection        Plan:       Vaginal yeast infection  -     fluconazole (DIFLUCAN) 150 MG Tab; Take one table now and repeat in 3 days  Dispense: 2 tablet; Refill: 0

## 2024-11-21 ENCOUNTER — ON-DEMAND VIRTUAL (OUTPATIENT)
Dept: URGENT CARE | Facility: CLINIC | Age: 34
End: 2024-11-21
Payer: MEDICAID

## 2024-11-21 ENCOUNTER — PATIENT MESSAGE (OUTPATIENT)
Dept: URGENT CARE | Facility: CLINIC | Age: 34
End: 2024-11-21

## 2024-11-21 DIAGNOSIS — N76.0 ACUTE VAGINITIS: Primary | ICD-10-CM

## 2024-11-21 RX ORDER — FLUCONAZOLE 200 MG/1
200 TABLET ORAL DAILY
Qty: 3 TABLET | Refills: 0 | Status: SHIPPED | OUTPATIENT
Start: 2024-11-21 | End: 2024-11-24

## 2024-11-21 NOTE — PROGRESS NOTES
"Subjective:      Patient ID: Georgina Mehta is a 33 y.o. female.    Vitals:  vitals were not taken for this visit.     Chief Complaint: Vaginitis      Visit Type: TELE AUDIOVISUAL    Present with the patient at the time of consultation: TELEMED PRESENT WITH PATIENT: None    Past Medical History:   Diagnosis Date    Chlamydia     Eczema     Gonorrhea 08/08/2024    Prediabetes 08/08/2024     Past Surgical History:   Procedure Laterality Date    CHOLECYSTECTOMY       Review of patient's allergies indicates:  No Known Allergies  Current Outpatient Medications on File Prior to Visit   Medication Sig Dispense Refill    buPROPion (WELLBUTRIN SR) 100 MG TBSR 12 hr tablet Take 100 mg by mouth.      mupirocin (BACTROBAN) 2 % ointment Apply topically 3 (three) times daily. 30 g 3     No current facility-administered medications on file prior to visit.     Family History   Problem Relation Name Age of Onset    Breast cancer Maternal Aunt      Colon cancer Neg Hx      Ovarian cancer Neg Hx         Medications Ordered                [id number] - [facility] [address] [place] [id number] [address]    Telephone: [phone]   Fax: [phone]   Hours: Not open 24 hours                         E-Prescribed (1 of 1)              fluconazole (DIFLUCAN) 200 MG Tab    Sig: Take 1 tablet (200 mg total) by mouth once daily. for 3 days       Start: 11/21/24     Quantity: 3 tablet Refills: 0                           Ohs Peq Odvv Intake    11/21/2024 12:55 PM CST - Filed by Patient   What is your current physical address in the event of a medical emergency?    Are you able to take your vital signs? No   Please attach any relevant images or files    Is your employer contracted with [facility] [employer] [facility]? No         Patient states visiting from [facility]. C/o "yeast infection", w recurrence, recently completed course of diflucan " 150 x 2 doses along w flagyl and return of symptoms  No report of vaginal bleeding, pain, rash, lesions odor          Constitution: Negative for chills, sweating, fatigue and fever.   Neck: Negative for painful lymph nodes.   Cardiovascular:  Negative for chest pain, palpitations and sob on exertion.   Respiratory:  Negative for chest tightness, cough and shortness of breath.    Gastrointestinal:  Negative for abdominal pain, nausea, vomiting, constipation, diarrhea, bright red blood in stool, dark colored stools and rectal bleeding.   Genitourinary:  Negative for dysuria, frequency, urgency, flank pain, hematuria, vaginal pain, vaginal discharge, vaginal bleeding, vaginal odor, genital sore and pelvic pain.   Musculoskeletal:  Negative for muscle ache.   Skin:  Negative for color change, pale and rash.   Hematologic/Lymphatic: Negative for swollen lymph nodes.        Objective:   The physical exam was conducted virtually.  Physical Exam   Constitutional: She is oriented to person, place, and time. No distress.   HENT:   Head: Normocephalic and atraumatic.   Mouth/Throat: Oropharynx is clear and moist and mucous membranes are normal.   Eyes: Conjunctivae are normal. No scleral icterus.   Pulmonary/Chest: Effort normal. No respiratory distress.   Musculoskeletal: Normal range of motion.         General: Normal range of motion.   Neurological: She is alert and oriented to person, place, and time.   Skin: Skin is not diaphoretic.   Psychiatric: Her behavior is normal. Judgment and thought content normal.   Vitals reviewed.      Assessment:     1. Acute vaginitis        Plan:       Acute vaginitis  -     fluconazole (DIFLUCAN) 200 MG Tab; Take 1 tablet (200 mg total) by mouth once daily. for 3 days  Dispense: 3 tablet; Refill: 0      Patient Instructions   - You must understand that you have received an Urgent Care treatment only and that you may be released before all of your medical problems are known or treated.   -  You, the patient, will arrange for follow up care as instructed.   - If your condition worsens or fails to improve we recommend that you receive another evaluation at the ER immediately or contact your PCP to discuss your concerns or return here.     Advised on return/follow-up precautions. Advised on ER precautions. Answered all patient questions. Patient verbalized understanding and voiced agreement with current treatment plan.

## 2025-06-26 NOTE — ED TRIAGE NOTES
Lt eye pain since waking this am.  No contacts. + tearing. No discharge. Pain 1/10. No injury.   She just had labs on 6/6/25 and does not need anything checked now. We will repeat labs at her next OV on 10/2/25. Please let patient know.

## 2025-07-15 ENCOUNTER — ON-DEMAND VIRTUAL (OUTPATIENT)
Dept: URGENT CARE | Facility: CLINIC | Age: 35
End: 2025-07-15
Payer: MEDICAID

## 2025-07-15 DIAGNOSIS — B37.31 VAGINAL YEAST INFECTION: ICD-10-CM

## 2025-07-15 DIAGNOSIS — N76.0 ACUTE VAGINITIS: Primary | ICD-10-CM

## 2025-07-15 PROCEDURE — 98005 SYNCH AUDIO-VIDEO EST LOW 20: CPT | Mod: 95,,, | Performed by: NURSE PRACTITIONER

## 2025-07-15 RX ORDER — FLUCONAZOLE 150 MG/1
150 TABLET ORAL ONCE
Qty: 1 TABLET | Refills: 0 | Status: SHIPPED | OUTPATIENT
Start: 2025-07-15 | End: 2025-07-15

## 2025-07-15 RX ORDER — METRONIDAZOLE 500 MG/1
500 TABLET ORAL EVERY 12 HOURS
Qty: 14 TABLET | Refills: 0 | Status: SHIPPED | OUTPATIENT
Start: 2025-07-15 | End: 2025-07-22

## 2025-07-15 NOTE — PROGRESS NOTES
Subjective:      Patient ID: Georgina Mehta is a 34 y.o. female.    Vitals:  vitals were not taken for this visit.     Chief Complaint: Vaginal Discharge      Visit Type: TELE AUDIOVISUAL    Patient Location: [place]     Present with the patient at the time of consultation: TELEMED PRESENT WITH PATIENT: None    Past Medical History:   Diagnosis Date    Chlamydia     Eczema     Gonorrhea 08/08/2024    Prediabetes 08/08/2024     Past Surgical History:   Procedure Laterality Date    CHOLECYSTECTOMY       Review of patient's allergies indicates:  No Known Allergies  Medications Ordered Prior to Encounter[1]  Family History   Problem Relation Name Age of Onset    Breast cancer Maternal Aunt      Colon cancer Neg Hx      Ovarian cancer Neg Hx         Medications Ordered                [id number] [place] [facility] [place] [facility] [address] [address] [facility]   [address] [place] [address]    Telephone: [phone]   Fax: [phone]   Hours: Not open 24 hours                         E-Prescribed (2 of 2)              fluconazole (DIFLUCAN) 150 MG Tab    Sig: Take 1 tablet (150 mg total) by mouth once. for 1 dose       Start: 7/15/25     Quantity: 1 tablet Refills: 0                         metroNIDAZOLE (FLAGYL) 500 MG tablet    Sig: Take 1 tablet (500 mg total) by mouth every 12 (twelve) hours. for 7 days       Start: 7/15/25     Quantity: 14 tablet Refills: 0                           Ohs Peq Odvv Intake    7/15/2025  1:30 PM CDT - Filed by Patient   What is your current physical address in the event of a medical emergency?    Are you able to take your vital signs? No   Please attach any relevant images or files    Is your employer contracted with [facility]? No         Pt presents with c/o vaginal discharge with odor x 1 day, also vaginal itching x 1 day.   Reports gets these frequently, requesting meds.  Denies CP, SOB,  dizziness, ha, or pain with intercourse.    LMP : 7/10/2025      Vaginal Discharge  The patient's primary symptoms include vaginal discharge. The patient's pertinent negatives include no pelvic pain. Pertinent negatives include no abdominal pain, back pain, fever or headaches.       Constitution: Negative for fever.   Respiratory:  Negative for shortness of breath.    Gastrointestinal:  Negative for abdominal pain.   Genitourinary:  Positive for vaginal discharge and vaginal odor. Negative for pelvic pain.   Musculoskeletal:  Negative for back pain.   Neurological:  Negative for dizziness and headaches.        Objective:   The physical exam was conducted virtually.  Physical Exam   Constitutional: She is oriented to person, place, and time. No distress.   HENT:   Head: Normocephalic.   Ears:   Right Ear: External ear normal.   Left Ear: External ear normal.   Nose: No rhinorrhea or congestion.   Eyes: Conjunctivae are normal.   Neck: Neck supple.   Pulmonary/Chest: Effort normal. No respiratory distress.   Neurological: She is alert and oriented to person, place, and time.   Skin: Skin is no rash.       Assessment:     1. Acute vaginitis    2. Vaginal yeast infection        Plan:   Take meds as directed    If worsening symptoms follow up as directed     Acute vaginitis  -     metroNIDAZOLE (FLAGYL) 500 MG tablet; Take 1 tablet (500 mg total) by mouth every 12 (twelve) hours. for 7 days  Dispense: 14 tablet; Refill: 0    Vaginal yeast infection  -     fluconazole (DIFLUCAN) 150 MG Tab; Take 1 tablet (150 mg total) by mouth once. for 1 dose  Dispense: 1 tablet; Refill: 0      We appreciate you trusting us with your medical care. We hope you feel better soon. We will be happy to take care of you for all of your future medical needs.     You must understand that you've received Virtual treatment only and that you may be released before all your medical problems are known or treated. You, the patient, will arrange for  follow up care as instructed.     Follow up with your PCP or specialty clinic as directed in the next 1-2 weeks if not improved or as needed. You can call [phone] to schedule an appointment with the appropriate provider.     If your condition worsens we recommend that you receive another evaluation in person, with your primary care provider, urgent care or at the emergency room immediately or contact your primary medical clinics after hours call service to discuss your concerns.                   [1]   Current Outpatient Medications on File Prior to Visit   Medication Sig Dispense Refill    azithromycin (ZITHROMAX) 500 MG tablet Begin after completing the Doxycycline. Take 2 tablets by mouth on day 1; and then 1 tablet daily for an additional 3 days. (Patient not taking: Reported on 1/13/2025) 5 tablet 0    buPROPion (WELLBUTRIN SR) 100 MG TBSR 12 hr tablet Take 100 mg by mouth. (Patient not taking: Reported on 1/13/2025)      fluconazole (DIFLUCAN) 150 MG Tab Take one tablet every 3 days for 3 doses (over 9 days) 3 tablet 0    FLUoxetine 10 MG capsule Take 10 mg by mouth once daily.      mupirocin (BACTROBAN) 2 % ointment Apply topically 3 (three) times daily. (Patient not taking: Reported on 1/13/2025) 30 g 3    nystatin (MYCOSTATIN) cream Apply topically 2 (two) times daily. (Patient not taking: Reported on 1/13/2025) 30 g 0     No current facility-administered medications on file prior to visit.

## 2025-07-15 NOTE — PATIENT INSTRUCTIONS

## 2025-08-19 ENCOUNTER — ON-DEMAND VIRTUAL (OUTPATIENT)
Dept: URGENT CARE | Facility: CLINIC | Age: 35
End: 2025-08-19
Payer: MEDICAID

## 2025-08-19 DIAGNOSIS — R35.0 URINARY FREQUENCY: Primary | ICD-10-CM

## 2025-08-19 DIAGNOSIS — N76.0 ACUTE VAGINITIS: ICD-10-CM

## 2025-08-19 DIAGNOSIS — Z34.90 EARLY STAGE OF PREGNANCY: ICD-10-CM

## 2025-08-19 PROCEDURE — 98005 SYNCH AUDIO-VIDEO EST LOW 20: CPT | Mod: 95,,, | Performed by: NURSE PRACTITIONER

## 2025-08-19 RX ORDER — CEPHALEXIN 500 MG/1
500 CAPSULE ORAL EVERY 12 HOURS
Qty: 14 CAPSULE | Refills: 0 | Status: SHIPPED | OUTPATIENT
Start: 2025-08-19 | End: 2025-08-19 | Stop reason: ALTCHOICE

## 2025-08-19 RX ORDER — METRONIDAZOLE 500 MG/1
500 TABLET ORAL EVERY 12 HOURS
Qty: 14 TABLET | Refills: 0 | Status: SHIPPED | OUTPATIENT
Start: 2025-08-19 | End: 2025-08-26